# Patient Record
Sex: MALE | Race: WHITE
[De-identification: names, ages, dates, MRNs, and addresses within clinical notes are randomized per-mention and may not be internally consistent; named-entity substitution may affect disease eponyms.]

---
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
## 2018-02-19 NOTE — DIAGNOSTIC IMAGING REPORT
R SHOULDER MIN 2 VIEWS



CLINICAL HISTORY: Right shoulder pain.    



COMPARISON: None



FINDINGS:  Alignment of the right shoulder is anatomic. There is no fracture or

osseous lesion. Joint spaces are preserved.



IMPRESSION: Unremarkable right shoulder radiographs.







Electronically signed by:  Vega Manning M.D.

2/19/2018 3:04 PM



Dictated Date/Time:  2/19/2018 3:03 PM

## 2018-03-01 ENCOUNTER — HOSPITAL ENCOUNTER (OUTPATIENT)
Dept: HOSPITAL 45 - C.MRIBC | Age: 18
Discharge: HOME | End: 2018-03-01
Attending: FAMILY MEDICINE
Payer: COMMERCIAL

## 2018-03-01 DIAGNOSIS — M25.311: ICD-10-CM

## 2018-03-01 DIAGNOSIS — M25.511: Primary | ICD-10-CM

## 2018-03-01 NOTE — DIAGNOSTIC IMAGING REPORT
R UPPER EXT JOINT WITH



CLINICAL HISTORY: INSTABILITY RIGHT SHOULDER    



TECHNIQUE: MRI multi axial acquisition post shoulder arthrography



COMPARISON STUDY:  None



FINDINGS: Signal characteristics of the bony structures are unremarkable. There

is no bone marrow replacing process.



The rotator cuff is intact. No evidence for rotator cuff tear.



Labrum is unremarkable. Biceps tendon is intact within the bicipital groove.



IMPRESSION:  Negative study 









The above report was generated using voice recognition software.  It may contain

grammatical, syntax or spelling errors.







Electronically signed by:  Magdaleno Tarango M.D.

3/1/2018 12:26 PM



Dictated Date/Time:  3/1/2018 12:23 PM

## 2018-03-01 NOTE — DIAGNOSTIC IMAGING REPORT
FLUOROSCOPICALLY GUIDED RIGHT SHOULDER ARTHROGRAM PRIOR TO MRI



CLINICAL HISTORY: INSTABILITY RIGHT SHOULDER    



COMPARISON STUDY:  Right shoulder radiographs February 19, 2018.



Fluoroscopy time: 10 seconds. 1 fluoroscopic image was obtained.



Procedure: The procedure, risks and benefits were discussed with the patient.

The patient agreed to the procedure and informed written consent was obtained.

The procedure was performed by Dr. Manning following a timeout. Skin overlying

the right glenohumeral joint was prepped and draped in sterile fashion and local

anesthesia was achieved with 1% lidocaine. Under intermittent fluoroscopic

guidance, a 2 1/2 inch 22-gauge needle was directed into the right glenohumeral

joint. Positioning within the joint space was confirmed with injection of a

small amount of contrast. This time, 10 cc of a mixture of 0.05 cc of

gadolinium, 10 cc of Optiray 300 cc and 10 cc of normal saline was injected into

the right glenohumeral joint. The needle was removed. The patient tolerated the

procedure well and no immediate complications were evident. The patient was

transported to MRI.



IMPRESSION:  Fluoroscopically guided right shoulder arthrogram prior to MRI. 









Electronically signed by:  Vega Manning M.D.

3/1/2018 11:55 AM



Dictated Date/Time:  3/1/2018 11:54 AM

## 2018-04-06 ENCOUNTER — HOSPITAL ENCOUNTER (EMERGENCY)
Dept: HOSPITAL 45 - C.EDB | Age: 18
Discharge: HOME | End: 2018-04-06
Payer: COMMERCIAL

## 2018-04-06 VITALS — DIASTOLIC BLOOD PRESSURE: 56 MMHG | OXYGEN SATURATION: 96 % | HEART RATE: 72 BPM | SYSTOLIC BLOOD PRESSURE: 117 MMHG

## 2018-04-06 VITALS
HEIGHT: 69.02 IN | WEIGHT: 198.42 LBS | WEIGHT: 198.42 LBS | HEIGHT: 69.02 IN | BODY MASS INDEX: 29.39 KG/M2 | BODY MASS INDEX: 29.39 KG/M2

## 2018-04-06 VITALS — TEMPERATURE: 97.52 F

## 2018-04-06 DIAGNOSIS — R11.2: Primary | ICD-10-CM

## 2018-04-06 LAB
ALBUMIN SERPL-MCNC: 4.1 GM/DL (ref 3.4–5)
ALP SERPL-CCNC: 80 U/L (ref 45–117)
ALT SERPL-CCNC: 45 U/L (ref 12–78)
AST SERPL-CCNC: 43 U/L (ref 15–37)
BASOPHILS # BLD: 0.01 K/UL (ref 0–0.2)
BASOPHILS NFR BLD: 0.1 %
BUN SERPL-MCNC: 27 MG/DL (ref 7–18)
CALCIUM SERPL-MCNC: 9 MG/DL (ref 8.5–10.1)
CO2 SERPL-SCNC: 26 MMOL/L (ref 21–32)
CREAT SERPL-MCNC: 0.98 MG/DL (ref 0.6–1.4)
EOS ABS #: 0.07 K/UL (ref 0–0.5)
EOSINOPHIL NFR BLD AUTO: 174 K/UL (ref 130–400)
GLUCOSE SERPL-MCNC: 115 MG/DL (ref 70–99)
HCT VFR BLD CALC: 47.5 % (ref 42–52)
HGB BLD-MCNC: 17.4 G/DL (ref 14–18)
IG#: 0.03 K/UL (ref 0–0.02)
IMM GRANULOCYTES NFR BLD AUTO: 8.9 %
LIPASE: 79 U/L (ref 73–393)
LYMPHOCYTES # BLD: 1.13 K/UL (ref 1.2–3.4)
MCH RBC QN AUTO: 30.1 PG (ref 25–34)
MCHC RBC AUTO-ENTMCNC: 36.6 G/DL (ref 32–36)
MCV RBC AUTO: 82.2 FL (ref 80–100)
MONO ABS #: 0.77 K/UL (ref 0.11–0.59)
MONOCYTES NFR BLD: 6.1 %
NEUT ABS #: 10.7 K/UL (ref 1.4–6.5)
NEUTROPHILS # BLD AUTO: 0.6 %
NEUTROPHILS NFR BLD AUTO: 84.1 %
NRBC BLD AUTO-RTO: 0 %
NUCLEATED RED BLOOD CELL ABS: 0 K/UL (ref 0–0)
PMV BLD AUTO: 11.2 FL (ref 7.4–10.4)
POTASSIUM SERPL-SCNC: 3.8 MMOL/L (ref 3.5–5.1)
PROT SERPL-MCNC: 7.6 GM/DL (ref 6.4–8.2)
RED CELL DISTRIBUTION WIDTH CV: 13.3 % (ref 11.5–14.5)
RED CELL DISTRIBUTION WIDTH SD: 39.6 FL (ref 36.4–46.3)
SODIUM SERPL-SCNC: 139 MMOL/L (ref 136–145)
WBC # BLD AUTO: 12.71 K/UL (ref 4.8–10.8)

## 2018-04-06 NOTE — EMERGENCY ROOM VISIT NOTE
History


First contact with patient:  05:00


Chief Complaint:  VOMITING


Stated Complaint:  VOMITING


Nursing Triage Summary:  


nausea, left abd cramping since 2300 last night, weakness, exhausted, no recent 


illness, denies other symptoms





History of Present Illness


The patient is a 17 year old male who presents to the Emergency Room with 

complaints of vomiting which began 6 hours ago.  The patient reports that he 

had a sudden onset of vomiting 6 hours prior to arrival while in his dorm room.

  He states this occurred about 1 hour after eating.  He reports that there are 

multiple other students who live on his floor that are sick with similar 

symptoms.  He denies diarrhea.  He states that he has some mild burning pain in 

the left upper abdomen.  He states the abdominal pain started after the 

vomiting.  He states that he feels generally weak.  He denies any recent 

illness.  He denies any medical problems.  He rates his overall discomfort as 6/

10.  He did not take any medications for his symptoms.





Review of Systems


A complete 10 point review of systems was reviewed with the patient with 

pertinent positives and negatives as per history of present illness. All else 

were negative.





Past Medical/Surgical History


Medical Problems:


(1) No chronic problems








Family History





No pertinent family history





Social History


Smoking Status:  Never Smoker


Housing Status:  lives with roommate


Occupation Status:  [employer] student





Current/Historical Medications


Scheduled


Ondasetron Odt (Zofran Odt), 4 MG SL Q6H





Physical Exam


Vital Signs











  Date Time  Temp Pulse Resp B/P (MAP) Pulse Ox O2 Delivery O2 Flow Rate FiO2


 


4/6/18 05:08 36.4 86 15 115/76 97 Room Air  











Physical Exam


VITALS: Vitals are noted on the nurse's note and reviewed by myself.  Vital 

signs stable.


GENERAL: This is a 17-year-old male, in no acute distress, nondiaphoretic, well-

developed well-nourished.


SKIN: The skin was without rashes.


NECK: Supple without nuchal rigidity.  


HEART: Regular rate and rhythm without murmurs gallops or rubs.


LUNGS: Clear to auscultation bilaterally without wheezes, rales or rhonchi.  No 

retractions or accessory muscle use.


ABDOMEN: Positive bowel sounds x 4.  Soft, mild tenderness in the left upper 

quadrant.  No guarding or rebound tenderness.


NEURO: Patient was alert and oriented to person place and time.





Medical Decision & Procedures


Laboratory Results


4/6/18 05:20








Red Blood Count 5.78, Mean Corpuscular Volume 82.2, Mean Corpuscular Hemoglobin 

30.1, Mean Corpuscular Hemoglobin Concent 36.6, Mean Platelet Volume 11.2, 

Neutrophils (%) (Auto) 84.1, Lymphocytes (%) (Auto) 8.9, Monocytes (%) (Auto) 

6.1, Eosinophils (%) (Auto) 0.6, Basophils (%) (Auto) 0.1, Neutrophils # (Auto) 

10.70, Lymphocytes # (Auto) 1.13, Monocytes # (Auto) 0.77, Eosinophils # (Auto) 

0.07, Basophils # (Auto) 0.01





4/6/18 05:20

















Test


  4/6/18


05:20 4/6/18


05:55


 


White Blood Count


  12.71 K/uL


(4.8-10.8) 


 


 


Red Blood Count


  5.78 M/uL


(4.7-6.1) 


 


 


Hemoglobin


  17.4 g/dL


(14.0-18.0) 


 


 


Hematocrit 47.5 % (42-52)  


 


Mean Corpuscular Volume


  82.2 fL


() 


 


 


Mean Corpuscular Hemoglobin


  30.1 pg


(25-34) 


 


 


Mean Corpuscular Hemoglobin


Concent 36.6 g/dl


(32-36) 


 


 


Platelet Count


  174 K/uL


(130-400) 


 


 


Mean Platelet Volume


  11.2 fL


(7.4-10.4) 


 


 


Neutrophils (%) (Auto) 84.1 %  


 


Lymphocytes (%) (Auto) 8.9 %  


 


Monocytes (%) (Auto) 6.1 %  


 


Eosinophils (%) (Auto) 0.6 %  


 


Basophils (%) (Auto) 0.1 %  


 


Neutrophils # (Auto)


  10.70 K/uL


(1.4-6.5) 


 


 


Lymphocytes # (Auto)


  1.13 K/uL


(1.2-3.4) 


 


 


Monocytes # (Auto)


  0.77 K/uL


(0.11-0.59) 


 


 


Eosinophils # (Auto)


  0.07 K/uL


(0-0.5) 


 


 


Basophils # (Auto)


  0.01 K/uL


(0-0.2) 


 


 


RDW Standard Deviation


  39.6 fL


(36.4-46.3) 


 


 


RDW Coefficient of Variation


  13.3 %


(11.5-14.5) 


 


 


Immature Granulocyte % (Auto) 0.2 %  


 


Immature Granulocyte # (Auto)


  0.03 K/uL


(0.00-0.02) 


 


 


Nucleated RBC Absolute Count


(auto) 0.00 K/uL


(0-0) 


 


 


Nucleated Red Blood Cells % 0.0 %  


 


Anion Gap


  8.0 mmol/L


(3-11) 


 


 


Est Creatinine Clear Calc


Drug Dose 134.5 ml/min 


  


 


 


Estimated GFR (


American) 129.0 


  


 


 


Estimated GFR (Non-


American 111.3 


  


 


 


BUN/Creatinine Ratio 27.4 (10-20)  


 


Calcium Level


  9.0 mg/dl


(8.5-10.1) 


 


 


Total Bilirubin


  0.8 mg/dl


(0.2-1) 


 


 


Aspartate Amino Transf


(AST/SGOT) 43 U/L (15-37) 


  


 


 


Alanine Aminotransferase


(ALT/SGPT) 45 U/L (12-78) 


  


 


 


Alkaline Phosphatase


  80 U/L


() 


 


 


Total Protein


  7.6 gm/dl


(6.4-8.2) 


 


 


Albumin


  4.1 gm/dl


(3.4-5.0) 


 


 


Globulin


  3.5 gm/dl


(2.5-4.0) 


 


 


Albumin/Globulin Ratio 1.2 (0.9-2)  


 


Lipase


  79 U/L


() 


 


 


Urine Color  DK YELLOW 


 


Urine Appearance  CLEAR (CLEAR) 


 


Urine pH  7.0 (4.5-7.5) 


 


Urine Specific Gravity


  


  1.026


(1.000-1.030)


 


Urine Protein  NEG (NEG) 


 


Urine Glucose (UA)  NEG (NEG) 


 


Urine Ketones  NEG (NEG) 


 


Urine Occult Blood  NEG (NEG) 


 


Urine Nitrite  NEG (NEG) 


 


Urine Bilirubin  NEG (NEG) 


 


Urine Urobilinogen  NEG (NEG) 


 


Urine Leukocyte Esterase  NEG (NEG) 











Medications Administered











 Medications


  (Trade)  Dose


 Ordered  Sig/Sarah


 Route  Start Time


 Stop Time Status Last Admin


Dose Admin


 


 Ondansetron HCl


  (Zofran Odt)  4 mg  STK-MED ONCE


 .ROUTE  4/6/18 05:04


 4/6/18 05:05 DC 4/6/18 05:05


4 MG


 


 Ondansetron HCl


  (Zofran Inj)  4 mg  NOW  STAT


 IV  4/6/18 05:07


 4/6/18 05:08 DC 4/6/18 05:30


4 MG


 


 Sodium Chloride  1,000 ml @ 


 999 mls/hr  Q1H1M STAT


 IV  4/6/18 05:07


 4/6/18 06:07 DC 4/6/18 05:29


999 MLS/HR


 


 Ondansetron HCl


  (ZOFRAN ODT 4MG


 Home Pack)  1 [facility]  UD  ONCE


 PO  4/6/18 06:45


 4/6/18 06:46 DC 4/6/18 06:49


1 HOMEPACK











ED Course


The patient was evaluated as above.  Labs were drawn and IV access was 

obtained.  Patient was given 4 mg ODT Zofran.





Patient was medicated with 1 L normal saline solution and 4 mg Zofran IV.





Patient was reevaluated and was feeling significantly better.  Abdominal pain 

has resolved.  He was given powerade and was able to tolerate the entire bottle 

without difficulty.





Discharge instructions were reviewed with the patient.  The patient verbalized 

understanding of my assessment and treatment plan and was discharged home in 

good condition.





Medical Decision


Differential diagnosis includes gastroenteritis, among others.





The patient is a 17-year-old male who presents today complaining of vomiting.  

Patient lives in a dorm and multiple other students living in his dorm have 

similar symptoms.  Labs revealed mild leukocytosis of 12.7, likely secondary to 

vomiting.  Bilirubin slightly elevated, unclear significance.  Urinalysis was 

not suggestive of infection.  Patient was treated with IV hydration and 

antiemetics with significant improvement of his symptoms.  He was able to 

tolerate fluids by mouth without difficulty.  He was comfortable with discharge 

home.  He was given a home pack and prescription of Zofran and instructed to 

follow-up with [facility] services as needed.





Based on the patient's presentation and work up, I feel the patient is stable 

for outpatient treatment.  The patient was educated to return to the emergency 

department for any worsening of their current condition or new/concerning 

symptoms.  He will follow up with [facility].





Medication Reconcilliation


Current Medication List:  was personally reviewed by me





Blood Pressure Screening


Patient's blood pressure:  Normal blood pressure





Impression





 Primary Impression:  


 Vomiting





Departure Information


Dispostion


Home / Self-Care





Condition


GOOD





Prescriptions





Ondasetron Odt (ZOFRAN ODT) 4 Mg Tab


4 MG SL Q6H for Nausea, #15 TAB


   Prov: Yesenia Redmond .NATALYA         4/6/18





Referrals


[facility] (PCP)





Patient Instructions


[facility]





Additional Instructions





You have been prescribed Zofran to be used for any nausea or vomiting. Take as 

prescribed.





For pain control, you can use the following over-the-counter medicines (if >11 yo):





- Regular strength (325mg/tab) Tylenol (acetaminophen) 2 tabs every 4-6 hours 

as needed. Do not exceed 12 tablets in a 24 hour period. Avoid taking more than 

4 grams (4000 mg) of Tylenol per day. This includes any other sources of 

acetaminophen you may take on a regular basis.





- Regular strength (200 mg/tab) Advil (ibuprofen) 1-2 tabs every 4-6 hours as 

needed. Do not exceed a dose of 3200 mg per day.





Rest and drink plenty of fluids today.  Make sure to keep a bland diet.  I 

would advise the BRAT diet (bananas, rice, applesauce, toast).





Follow-up with [facility] this week for a recheck.





Return to the emergency department with any persistent vomiting, worsening 

abdominal pain, fever, or any other new/concerning symptoms.





Problem Qualifiers








 Primary Impression:  


 Vomiting


 Vomiting type:  unspecified  Vomiting Intractability:  non-intractable  Nausea 

presence:  with nausea  Qualified Codes:  R11.2 - Nausea with vomiting, 

unspecified